# Patient Record
Sex: FEMALE | Race: WHITE | Employment: OTHER | URBAN - NONMETROPOLITAN AREA
[De-identification: names, ages, dates, MRNs, and addresses within clinical notes are randomized per-mention and may not be internally consistent; named-entity substitution may affect disease eponyms.]

---

## 2023-12-29 PROBLEM — R53.83 FATIGUE: Status: ACTIVE | Noted: 2023-12-29

## 2023-12-29 PROBLEM — E03.9 HYPOTHYROIDISM: Status: ACTIVE | Noted: 2023-12-29

## 2023-12-29 PROBLEM — Z95.0 PACEMAKER: Status: ACTIVE | Noted: 2023-12-29

## 2023-12-29 PROBLEM — G47.30 SLEEP APNEA: Status: ACTIVE | Noted: 2023-12-29

## 2023-12-29 PROBLEM — E05.90 THYROTOXICOSIS WITH OR WITHOUT GOITER: Status: ACTIVE | Noted: 2023-12-29

## 2023-12-29 PROBLEM — I49.5 SICK SINUS SYNDROME DUE TO SA NODE DYSFUNCTION (MULTI): Status: ACTIVE | Noted: 2023-12-29

## 2023-12-29 PROBLEM — I10 HYPERTENSION: Status: ACTIVE | Noted: 2023-12-29

## 2023-12-29 PROBLEM — N18.30 CKD (CHRONIC KIDNEY DISEASE), STAGE III (MULTI): Status: ACTIVE | Noted: 2023-12-29

## 2023-12-29 PROBLEM — I48.0 PAROXYSMAL ATRIAL FIBRILLATION (MULTI): Status: ACTIVE | Noted: 2023-12-29

## 2023-12-29 PROBLEM — I48.92 ATRIAL FLUTTER (MULTI): Status: ACTIVE | Noted: 2023-12-29

## 2023-12-29 PROBLEM — E78.5 HYPERLIPIDEMIA: Status: ACTIVE | Noted: 2023-12-29

## 2024-01-11 LAB
Lab: 138 MG/DL (ref 11–20)
Lab: 169 PG/ML (ref 12–88)
Lab: 232 MG/G{CRE} (ref 0–200)
Lab: 32 MG/DL (ref 0–9)
NON-UH HIE % IRON SATURATION: 18.9 % (ref 20–50)
NON-UH HIE ALBUMIN LEVEL: 4.2 G/DL (ref 3.5–5.7)
NON-UH HIE ANION GAP: 12.2 (ref 6–15)
NON-UH HIE BLOOD UREA NITROGEN: 60 MG/DL (ref 7–25)
NON-UH HIE CALCIUM: 9.1 MG/DL (ref 8.6–10.3)
NON-UH HIE CARBON DIOXIDE: 24.7 MMOL/L (ref 21–31)
NON-UH HIE CHLORIDE: 105 MMOL/L (ref 98–107)
NON-UH HIE CHOL/HDL RATIO: 3.5
NON-UH HIE CHOLESTEROL: 131 MG/DL (ref 140–200)
NON-UH HIE CREATININE: 3.72 MG/DL (ref 0.6–1.2)
NON-UH HIE ESTIMATED GFR: 11.55
NON-UH HIE FERRITIN: 40.7 NG/ML (ref 11–306.8)
NON-UH HIE GLUCOSE: 99 MG/DL (ref 70–100)
NON-UH HIE HDL CHOLESTEROL: 37 MG/DL (ref 23–92)
NON-UH HIE HEMATOCRIT: 36.2 % (ref 34–46.4)
NON-UH HIE HEMOGLOBIN: 12 G/DL (ref 11.8–15.4)
NON-UH HIE IRON: 63 UG/DL (ref 50–212)
NON-UH HIE LDL CHOLESTEROL,CALCULATED: 58 MG/DL (ref 0–100)
NON-UH HIE MAGNESIUM: 1.9 MG/DL (ref 1.9–2.7)
NON-UH HIE MEAN CORPUSCULAR HEMOGLOBIN: 31.9 PG (ref 24.7–34.3)
NON-UH HIE MEAN CORPUSCULAR HGB CONC: 33.2 G/DL (ref 32–35)
NON-UH HIE MEAN CORPUSCULAR VOLUME: 96.2 FL (ref 80–100)
NON-UH HIE MEAN PLATELET VOLUME: 8.3 FL (ref 6.3–10.7)
NON-UH HIE PHOSPHORUS: 4.2 MG/DL (ref 2.5–4.5)
NON-UH HIE PLATELET COUNT: 253 10*3/UL (ref 150–450)
NON-UH HIE POTASSIUM: 4.9 MMOL/L (ref 3.5–5.1)
NON-UH HIE RED BLOOD COUNT: 3.77 (ref 3.6–5)
NON-UH HIE RED CELL DISTRIBUTION WIDTH: 13.9 % (ref 11.9–15.3)
NON-UH HIE SODIUM: 137 MMOL/L (ref 136–145)
NON-UH HIE TOTAL IRON BINDING CAPACITY: 333 UG/DL (ref 255–450)
NON-UH HIE TRANSFERRIN: 238 MG/DL (ref 203–362)
NON-UH HIE TRIGLYCERIDE W/REFLEX: 180 MG/DL (ref 0–149)
NON-UH HIE URIC ACID: 6.8 MG/DL (ref 2.3–6.6)
NON-UH HIE VITAMIN D 25 HYDROXY TOTAL: 40.9 NG/ML (ref 30–100)
NON-UH HIE VLDL CHOLESTEROL: 36 MG/DL
NON-UH HIE WHITE BLOOD COUNT: 6.9 10*3/UL (ref 3.8–11.6)

## 2024-01-17 ENCOUNTER — TELEPHONE (OUTPATIENT)
Dept: CARDIOLOGY | Facility: CLINIC | Age: 84
End: 2024-01-17
Payer: MEDICARE

## 2024-01-17 NOTE — TELEPHONE ENCOUNTER
Attempted to call patient to advise appointment with MT tomorrow 1/18 has been cancelled d/t MT being out of office. Phone is disconnected as well as the emergency contact.

## 2024-01-18 ENCOUNTER — APPOINTMENT (OUTPATIENT)
Dept: CARDIOLOGY | Facility: CLINIC | Age: 84
End: 2024-01-18
Payer: MEDICARE

## 2024-02-28 ENCOUNTER — OFFICE VISIT (OUTPATIENT)
Dept: CARDIOLOGY | Facility: CLINIC | Age: 84
End: 2024-02-28
Payer: MEDICARE

## 2024-02-28 VITALS
HEIGHT: 64 IN | BODY MASS INDEX: 21.85 KG/M2 | HEART RATE: 60 BPM | DIASTOLIC BLOOD PRESSURE: 68 MMHG | SYSTOLIC BLOOD PRESSURE: 116 MMHG | WEIGHT: 128 LBS

## 2024-02-28 DIAGNOSIS — I48.0 PAROXYSMAL ATRIAL FIBRILLATION (MULTI): Primary | ICD-10-CM

## 2024-02-28 DIAGNOSIS — E78.2 MIXED HYPERLIPIDEMIA: ICD-10-CM

## 2024-02-28 DIAGNOSIS — I10 PRIMARY HYPERTENSION: ICD-10-CM

## 2024-02-28 DIAGNOSIS — I48.3 TYPICAL ATRIAL FLUTTER (MULTI): ICD-10-CM

## 2024-02-28 DIAGNOSIS — Z95.0 PACEMAKER: ICD-10-CM

## 2024-02-28 DIAGNOSIS — Z78.9 NEVER SMOKED CIGARETTES: ICD-10-CM

## 2024-02-28 DIAGNOSIS — G47.33 OBSTRUCTIVE SLEEP APNEA SYNDROME: ICD-10-CM

## 2024-02-28 PROCEDURE — 93000 ELECTROCARDIOGRAM COMPLETE: CPT | Performed by: INTERNAL MEDICINE

## 2024-02-28 PROCEDURE — 1160F RVW MEDS BY RX/DR IN RCRD: CPT | Performed by: INTERNAL MEDICINE

## 2024-02-28 PROCEDURE — 3074F SYST BP LT 130 MM HG: CPT | Performed by: INTERNAL MEDICINE

## 2024-02-28 PROCEDURE — 3078F DIAST BP <80 MM HG: CPT | Performed by: INTERNAL MEDICINE

## 2024-02-28 PROCEDURE — 1159F MED LIST DOCD IN RCRD: CPT | Performed by: INTERNAL MEDICINE

## 2024-02-28 PROCEDURE — 99214 OFFICE O/P EST MOD 30 MIN: CPT | Performed by: INTERNAL MEDICINE

## 2024-02-28 NOTE — PROGRESS NOTES
Subjective   Saniya Carrillo is a 83 y.o. female       Chief Complaint    Follow-up          HPI        Patient is here for follow-up continue management for history of tachybradycardia syndrome, long-term anticoagulation, hyperlipidemia and chronic kidney disease.  Since last time I saw her she reports she is doing well.  She denies any cardiac complaint of chest pain, palpitation, lightheadedness, dizziness or syncope.  Her recent device check and lab work noted and reviewed with her.    ASSESSMENT:      1. 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  2. Anticoagulation has been discussed numerous times with the patient and her son. They both elected for aspirin therapy alone.  3. Hyperlipidemia, on treatment.  4. Kidney disease apparently approaching stage IV she followed by nephrology. She underwent shunt placement in the right arm   5. Mild senile dementia.  6. Minimal obesity  7. Increased risk of fall. Patient reports a couple fall in the recent past          RECOMMENDATIONS:      1. The patient will remain on the same therapy unchanged. Encouraged her to continue to be compliant with aspirin  2. Risks, benefits, and alternatives of anticoagulation were reviewed with the patient. The patient elected for aspirin therapy.  3. The patient was advised to continue current therapy unchanged.  4. The patient was advised to notify me any change in cardiac status or symptoms.  5. I with her her recent hospitalization record and device check  6. We will see her back in the office in 6 months in followup. EKG  7. Advised the patient to discuss with her vascular surgeon the outcome of her recent shunt placement concerning I could not feel any thrill  Review of Systems   All other systems reviewed  "and are negative.           Vitals:    02/28/24 1440   BP: 116/68   BP Location: Left arm   Patient Position: Sitting   Pulse: 60   Weight: 58.1 kg (128 lb)   Height: 1.626 m (5' 4\")        EKG done in office today     Objective   Physical Exam    Allergies  Chlorpheniramine and Latex     Current Medications    Current Outpatient Medications:     Aricept 10 mg tablet, Take 1 tablet (10 mg) by mouth once daily., Disp: , Rfl:     aspirin 81 mg EC tablet, Take 1 tablet (81 mg) by mouth once daily., Disp: , Rfl:     buPROPion XL (Wellbutrin XL) 150 mg 24 hr tablet, Take 1 tablet (150 mg) by mouth 2 times a day., Disp: , Rfl:     calcitriol (Rocaltrol) 0.25 mcg capsule, Take 1 capsule (0.25 mcg) by mouth 2 times a week. Monday and Friday, Disp: , Rfl:     cholecalciferol (Vitamin D-3) 25 MCG (1000 UT) capsule, Take 1 capsule (25 mcg) by mouth once daily., Disp: , Rfl:     citalopram (CeleXA) 20 mg tablet, Take 1 tablet (20 mg) by mouth once daily., Disp: , Rfl:     flecainide (Tambocor) 50 mg tablet, Take 1 tablet (50 mg) by mouth every 12 hours., Disp: , Rfl:     levothyroxine (Synthroid, Levoxyl) 100 mcg tablet, Take 1 tablet (100 mcg) by mouth once daily., Disp: , Rfl:     LORazepam (Ativan) 0.5 mg tablet, Take 1 tablet (0.5 mg) by mouth every 12 hours., Disp: , Rfl:     pantoprazole (ProtoNix) 40 mg EC tablet, Take 1 tablet (40 mg) by mouth once daily., Disp: , Rfl:     rosuvastatin (Crestor) 5 mg tablet, Take 1 tablet (5 mg) by mouth once daily at bedtime., Disp: , Rfl:                      Assessment/Plan   1. Paroxysmal atrial fibrillation (CMS/HCC)  Follow Up In Cardiology    ECG 12 Lead      2. Typical atrial flutter (CMS/HCC)        3. Mixed hyperlipidemia        4. Primary hypertension        5. Pacemaker        6. Obstructive sleep apnea syndrome        7. BMI 21.0-21.9, adult        8. Never smoked cigarettes                 Scribe Attestation  By signing my name below, ELIJAH [email or web address] , Scribcullen   attest that " this documentation has been prepared under the direction and in the presence of Maame Adorno MD.     Provider Attestation - Scribe documentation    All medical record entries made by the Scribe were at my direction and personally dictated by me. I have reviewed the chart and agree that the record accurately reflects my personal performance of the history, physical exam, discussion and plan.

## 2024-05-13 DIAGNOSIS — I48.92 UNSPECIFIED ATRIAL FLUTTER (MULTI): ICD-10-CM

## 2024-05-15 RX ORDER — FLECAINIDE ACETATE 50 MG/1
50 TABLET ORAL EVERY 12 HOURS
Qty: 180 TABLET | Refills: 3 | Status: SHIPPED | OUTPATIENT
Start: 2024-05-15

## 2024-07-23 ENCOUNTER — APPOINTMENT (OUTPATIENT)
Dept: OTOLARYNGOLOGY | Facility: CLINIC | Age: 84
End: 2024-07-23
Payer: MEDICARE

## 2024-07-23 VITALS — BODY MASS INDEX: 21.81 KG/M2 | WEIGHT: 130.9 LBS | HEIGHT: 65 IN

## 2024-07-23 DIAGNOSIS — R05.3 CHRONIC COUGH: ICD-10-CM

## 2024-07-23 DIAGNOSIS — R09.82 PND (POST-NASAL DRIP): Primary | ICD-10-CM

## 2024-07-23 PROCEDURE — 31231 NASAL ENDOSCOPY DX: CPT | Performed by: OTOLARYNGOLOGY

## 2024-07-23 PROCEDURE — 1160F RVW MEDS BY RX/DR IN RCRD: CPT | Performed by: OTOLARYNGOLOGY

## 2024-07-23 PROCEDURE — 1159F MED LIST DOCD IN RCRD: CPT | Performed by: OTOLARYNGOLOGY

## 2024-07-23 PROCEDURE — 99203 OFFICE O/P NEW LOW 30 MIN: CPT | Performed by: OTOLARYNGOLOGY

## 2024-07-23 PROCEDURE — 1126F AMNT PAIN NOTED NONE PRSNT: CPT | Performed by: OTOLARYNGOLOGY

## 2024-07-23 RX ORDER — DOCUSATE SODIUM 100 MG/1
CAPSULE, LIQUID FILLED ORAL
COMMUNITY
Start: 2023-09-16

## 2024-07-23 RX ORDER — ASCORBIC ACID 125 MG
TABLET,CHEWABLE ORAL
COMMUNITY

## 2024-07-23 ASSESSMENT — PAIN SCALES - GENERAL: PAINLEVEL: 0-NO PAIN

## 2024-07-23 NOTE — PROGRESS NOTES
"Chief Complaint:  Chronic cough    Referring Provider: No ref. provider found    History of Present Illness:    Saniya Carrillo is a 83 y.o. female, presenting for evaluation of postnasal drainage and chronic cough.  She states that she feels the sensation of postnasal drip dripping down the back of her throat.   She takes Protonix 40 mg daily for reflux disease.  She presents today with the main complaint of chronic cough.  She underwent Clarifix and treatment with ipratropium bromide, neither of which worked.  She denies any recent sinus infections.  She has never had surgery on her nose except for the previously mentioned cryoablation treatment.  Her son accompanies her today and provides most of her history.    ?  Review of Systems:    Review of symptoms was negative except for those stated including Cardiopulmonary, Genitourinary, Gastrointestinal, Psychological, Sleep pattern, Endocrine, Eyes, Neurologic, Musculoskeletal, Skin, Hematologic/Lymphatic and Allergic/Immunologic.     Medical History:    I have reviewed the patient's updated past medical history, surgical history, family history, social history, as well as current medications and allergies as of 7/23/2024. Changes to these items have been updated and marked as reviewed in the electronic medical record.    Physical Exam:    Vitals:  height is 1.638 m (5' 4.5\") and weight is 59.4 kg (130 lb 14.4 oz).   General: Patient doing well overall and is in no apparent distress.  Psych: Pleasant affect, and answers questions appropriately.  Head & Face: Symmetric facial movements  Eyes: Pupils equal, round, reactive.  Extraocular movements intact without gaze restrictions or nystagmus. No epiphora.  Ears:  External auditory canals are normal.  Tympanic membranes are clear.  No middle ear effusion is seen.  All middle ear landmarks are normal.  Nose: Anterior rhinoscopy revealed normal sinonasal mucosa. More posterior areas of the nasal cavity could not be " completely examined.  Oral Cavity/Oropharynx:  Without lesions or masses to visual exam.  Neck: Supple without lymphadenopathy.  Lungs: Non-labored, and without evidence of stridor.  Cardiac: Pulses are strong, well-perfused.  Extremities: Without gross evidence of clubbing, cyanosis, or edema.  Neuro: Cranial nerves II-XII grossly intact; Intact facial movements.    Assessment:     Saniya Carrillo is a 83 y.o. female with chronic cough, sensation of postnasal drip, nonproductive nasal congestion    Plan:      I spent a considerable amount of time discussing exam ongoing issues with nasal congestion and possible postnasal drip.  On nasal endoscopy she does not have any evidence of sinusitis or postnasal drainage.  She does seem to have some postcricoid swelling however.  It is possible that undertreated reflux disease could be contributing to her chronic cough and other symptoms.  She may have vasomotor rhinitis but if so this is a relatively minor contributor. She did not respond to Clarifix or ipratropium bromide.  Her main issue is habitual and chronic throat clearing which both her and her son state is occurring throughout the day every day.  I offered her a trial of mometasone rinses but she was not interested.  I think it would be reasonable for her to visit with our laryngology staff to determine if she has any other factors that would be contributing to her chronic cough    Sary Valdez MD, M.Eng.   of Otolaryngology - Head & Neck Surgery  Division of Rhinology and Endoscopic Skull Base Surgery  [facility]

## 2024-07-23 NOTE — LETTER
July 23, 2024     Caron Fuller MD  [id number]  Department Of Otolaryngology  [facility] [address]    Patient: Saniya Carrillo   YOB: 1940   Date of Visit: 7/23/2024       Dear Dr. Caron Fuller MD:    Thank you for referring Saniya Carrillo to me for evaluation. Below are my notes for this consultation.  If you have questions, please do not hesitate to call me. I look forward to following your patient along with you.       Sincerely,     Sary Valdez MD      CC: No Recipients  ______________________________________________________________________________________    Chief Complaint:  Chronic cough    Referring Provider: No ref. provider found    History of Present Illness:    Saniya Carrillo is a 83 y.o. female, presenting for evaluation of postnasal drainage and chronic cough.  She states that she feels the sensation of postnasal drip dripping down the back of her throat.  She has been following with Dr. Fuller who has been helping her with dysphagia.  She recently underwent an EGD and several other diagnostic tests.  She takes Protonix 40 mg daily for reflux disease.  She presents today with the main complaint of chronic cough.  She underwent Clarifix and treatment with ipratropium bromide, neither of which worked.  She denies any recent sinus infections.  She has never had surgery on her nose except for the previously mentioned  cryoablation treatment.       Review of Systems:    Review of symptoms was negative except for those stated including Cardiopulmonary, Genitourinary, Gastrointestinal, Psychological, Sleep pattern, Endocrine, Eyes, Neurologic, Musculoskeletal, Skin, Hematologic/Lymphatic and Allergic/Immunologic.     Medical History:    I have reviewed the patient's updated past medical history, surgical history, family history, social history, as well as current medications and allergies as of 7/23/2024. Changes to these items have been updated and marked as reviewed in the electronic  "medical record.    Physical Exam:    Vitals:  height is 1.638 m (5' 4.5\") and weight is 59.4 kg (130 lb 14.4 oz).   General: Patient doing well overall and is in no apparent distress.  Psych: Pleasant affect, and answers questions appropriately.  Head & Face: Symmetric facial movements  Eyes: Pupils equal, round, reactive.  Extraocular movements intact without gaze restrictions or nystagmus. No epiphora.  Ears:  External auditory canals are normal.  Tympanic membranes are clear.  No middle ear effusion is seen.  All middle ear landmarks are normal.  Nose: Anterior rhinoscopy revealed normal sinonasal mucosa. More posterior areas of the nasal cavity could not be completely examined.  Oral Cavity/Oropharynx:  Without lesions or masses to visual exam.  Neck: Supple without lymphadenopathy.  Lungs: Non-labored, and without evidence of stridor.  Cardiac: Pulses are strong, well-perfused.  Extremities: Without gross evidence of clubbing, cyanosis, or edema.  Neuro: Cranial nerves II-XII grossly intact; Intact facial movements.    Assessment:     Saniya Carrillo is a 83 y.o. female with chronic cough, sensation of postnasal drip, nonproductive nasal congestion    Plan:      I spent a considerable amount of time discussing exam ongoing issues with nasal congestion and possible postnasal drip.  On nasal endoscopy she does not have any evidence of sinusitis or postnasal drainage.  She does seem to have some postcricoid swelling however.  It is possible that undertreated reflux disease could be contributing to her chronic cough and other symptoms.  She may have vasomotor rhinitis but if so this is a relatively minor contributor. She did not respond to Clarifix or ipratropium bromide.  Her main issue is habitual and chronic throat clearing which both her and her son state is occurring throughout the day every day.  I offered her a trial of mometasone rinses but she was not interested.  I think it would be reasonable for her to " visit with our laryngology staff to determine if she has any other factors that would be contributing to her chronic cough    Sary Valdez MD, M.Eng.   of Otolaryngology - Head & Neck Surgery  Division of Rhinology and Endoscopic Skull Base Surgery  [facility]

## 2024-08-28 ENCOUNTER — APPOINTMENT (OUTPATIENT)
Dept: CARDIOLOGY | Facility: CLINIC | Age: 84
End: 2024-08-28
Payer: MEDICARE

## 2024-08-28 VITALS
WEIGHT: 130 LBS | BODY MASS INDEX: 21.66 KG/M2 | DIASTOLIC BLOOD PRESSURE: 56 MMHG | SYSTOLIC BLOOD PRESSURE: 120 MMHG | HEIGHT: 65 IN | HEART RATE: 60 BPM

## 2024-08-28 DIAGNOSIS — Z78.9 NEVER SMOKED CIGARETTES: ICD-10-CM

## 2024-08-28 DIAGNOSIS — I48.0 PAROXYSMAL ATRIAL FIBRILLATION (MULTI): ICD-10-CM

## 2024-08-28 DIAGNOSIS — I10 PRIMARY HYPERTENSION: ICD-10-CM

## 2024-08-28 DIAGNOSIS — E78.2 MIXED HYPERLIPIDEMIA: ICD-10-CM

## 2024-08-28 DIAGNOSIS — Z95.0 PACEMAKER: ICD-10-CM

## 2024-08-28 DIAGNOSIS — I49.5 SICK SINUS SYNDROME DUE TO SA NODE DYSFUNCTION (MULTI): ICD-10-CM

## 2024-08-28 DIAGNOSIS — I48.3 TYPICAL ATRIAL FLUTTER (MULTI): Primary | ICD-10-CM

## 2024-08-28 PROCEDURE — 3074F SYST BP LT 130 MM HG: CPT | Performed by: INTERNAL MEDICINE

## 2024-08-28 PROCEDURE — 1160F RVW MEDS BY RX/DR IN RCRD: CPT | Performed by: INTERNAL MEDICINE

## 2024-08-28 PROCEDURE — 3078F DIAST BP <80 MM HG: CPT | Performed by: INTERNAL MEDICINE

## 2024-08-28 PROCEDURE — 93000 ELECTROCARDIOGRAM COMPLETE: CPT | Performed by: INTERNAL MEDICINE

## 2024-08-28 PROCEDURE — 1036F TOBACCO NON-USER: CPT | Performed by: INTERNAL MEDICINE

## 2024-08-28 PROCEDURE — 99214 OFFICE O/P EST MOD 30 MIN: CPT | Performed by: INTERNAL MEDICINE

## 2024-08-28 PROCEDURE — 1159F MED LIST DOCD IN RCRD: CPT | Performed by: INTERNAL MEDICINE

## 2024-08-28 NOTE — LETTER
August 28, 2024     Lucas Mendenhall MD  [address] [place] [id number]    Patient: Saniya Carrillo   YOB: 1940   Date of Visit: 8/28/2024       Dear Dr. Lucas Mendenhall MD:    Thank you for referring Saniya Carrillo to me for evaluation. Below are my notes for this consultation.  If you have questions, please do not hesitate to call me. I look forward to following your patient along with you.       Sincerely,     Maame Adorno MD      CC: No Recipients  ______________________________________________________________________________________    Subjective   Saniya Carrillo is a 83 y.o. female       Chief Complaint    Follow-up          HPI      Patient is here for follow-up continue management for tachybradycardia syndrome, treatment with antiarrhythmic and hyperlipidemia.  Since last time I saw her she denies any change in cardiac status or symptoms.  She remains active.  She denies lightheadedness, dizziness or syncope.  Her recent device check noted and reviewed with her.  Now she has a functioning shunt in the right arm.    ASSESSMENT:      1. 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  2. Anticoagulation has been discussed numerous times with the patient and her son. They both elected for aspirin therapy alone.  3. Hyperlipidemia, on treatment.  4. Kidney disease apparently approaching stage IV she followed by nephrology. She underwent redo shunt placement in the right arm now is functioning  5. Mild senile dementia.  6. Minimal obesity  7. Increased risk of fall. Patient reports a couple fall in the recent past           RECOMMENDATIONS:      1. The patient will remain on the same therapy unchanged. Encouraged her to continue to be compliant with aspirin  2.  "Risks, benefits, and alternatives of anticoagulation were reviewed with the patient. The patient elected for aspirin therapy.  3. The patient was advised to continue current therapy unchanged.  4. The patient was advised to notify me any change in cardiac status or symptoms.  5. I with her labs and device check  6. We will see her back in the office in 6 months in followup. EKG    Review of Systems   All other systems reviewed and are negative.           Vitals:    08/28/24 1521   BP: 120/56   BP Location: Left arm   Patient Position: Sitting   Pulse: 60   Weight: 59 kg (130 lb)   Height: 1.638 m (5' 4.5\")      EKG done in office today      Objective   Physical Exam  Constitutional:       Appearance: Normal appearance.   HENT:      Nose: Nose normal.   Neck:      Vascular: No carotid bruit.   Cardiovascular:      Rate and Rhythm: Normal rate.      Pulses: Normal pulses.      Heart sounds: Normal heart sounds.   Pulmonary:      Effort: Pulmonary effort is normal.   Abdominal:      General: Bowel sounds are normal.      Palpations: Abdomen is soft.   Musculoskeletal:         General: Normal range of motion.      Cervical back: Normal range of motion.      Right lower leg: No edema.      Left lower leg: No edema.   Skin:     General: Skin is warm and dry.   Neurological:      General: No focal deficit present.      Mental Status: She is alert.   Psychiatric:         Mood and Affect: Mood normal.         Behavior: Behavior normal.         Thought Content: Thought content normal.         Judgment: Judgment normal.         Allergies  Chlorpheniramine and Latex     Current Medications    Current Outpatient Medications:   •  Aricept 10 mg tablet, Take 1 tablet (10 mg) by mouth once daily., Disp: , Rfl:   •  aspirin 81 mg EC tablet, Take 1 tablet (81 mg) by mouth once daily., Disp: , Rfl:   •  buPROPion XL (Wellbutrin XL) 150 mg 24 hr tablet, Take 1 tablet (150 mg) by mouth 2 times a day., Disp: , Rfl:   •  calcitriol " (Rocaltrol) 0.25 mcg capsule, Take 1 capsule (0.25 mcg) by mouth 2 times a week. Monday and Friday, Disp: , Rfl:   •  cholecalciferol (Vitamin D-3) 25 MCG (1000 UT) capsule, Take 1 capsule (25 mcg) by mouth once daily., Disp: , Rfl:   •  citalopram (CeleXA) 20 mg tablet, Take 1 tablet (20 mg) by mouth once daily., Disp: , Rfl:   •  docusate sodium (Colace) 100 mg capsule, Twice daily, Disp: , Rfl:   •  ferrous sulfate, as mg of FE, 15 mg iron (75 mg)/mL drops, Refills(s) 0, Disp: , Rfl:   •  flecainide (Tambocor) 50 mg tablet, TAKE 1 TABLET BY MOUTH EVERY 12 HOURS, Disp: 180 tablet, Rfl: 3  •  levothyroxine (Synthroid, Levoxyl) 100 mcg tablet, Take 1 tablet (100 mcg) by mouth once daily., Disp: , Rfl:   •  LORazepam (Ativan) 0.5 mg tablet, Take 1 tablet (0.5 mg) by mouth if needed., Disp: , Rfl:   •  pantoprazole (ProtoNix) 40 mg EC tablet, Take 1 tablet (40 mg) by mouth once daily., Disp: , Rfl:   •  rosuvastatin (Crestor) 5 mg tablet, Take 1 tablet (5 mg) by mouth once daily at bedtime., Disp: , Rfl:   •  ubidecarenone (coenzyme Q10) 50 mg tablet,chewable, as directed Orally, Disp: , Rfl:                      Assessment/Plan   1. Typical atrial flutter (Multi)  Follow Up In Cardiology    ECG 12 Lead      2. Paroxysmal atrial fibrillation (Multi)  Follow Up In Cardiology      3. Mixed hyperlipidemia        4. Primary hypertension        5. Pacemaker        6. Sick sinus syndrome due to SA node dysfunction (Multi)        7. BMI 21.0-21.9, adult        8. Never smoked cigarettes                 Scribe Attestation  By signing my name below, I, Naa FALL RN   , Scribe   attest that this documentation has been prepared under the direction and in the presence of Maame Adorno MD.     Provider Attestation - Scribe documentation    All medical record entries made by the Scribe were at my direction and personally dictated by me. I have reviewed the chart and agree that the record accurately reflects my personal  performance of the history, physical exam, discussion and plan.

## 2024-08-28 NOTE — PATIENT INSTRUCTIONS
Please bring all medicines, vitamins, and herbal supplements with you when you come to the office.    Prescriptions will not be filled unless you are compliant with your follow up appointments or have a follow up appointment scheduled as per instruction of your physician. Refills should be requested at the time of your visit.     Fall Prevention Education Given     Pacemaker/Defibrillator follow up per routine

## 2024-08-28 NOTE — PROGRESS NOTES
Subjective   Saniya Carrillo is a 83 y.o. female       Chief Complaint    Follow-up          HPI      Patient is here for follow-up continue management for tachybradycardia syndrome, treatment with antiarrhythmic and hyperlipidemia.  Since last time I saw her she denies any change in cardiac status or symptoms.  She remains active.  She denies lightheadedness, dizziness or syncope.  Her recent device check noted and reviewed with her.  Now she has a functioning shunt in the right arm.    ASSESSMENT:      1. 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  2. Anticoagulation has been discussed numerous times with the patient and her son. They both elected for aspirin therapy alone.  3. Hyperlipidemia, on treatment.  4. Kidney disease apparently approaching stage IV she followed by nephrology. She underwent redo shunt placement in the right arm now is functioning  5. Mild senile dementia.  6. Minimal obesity  7. Increased risk of fall. Patient reports a couple fall in the recent past           RECOMMENDATIONS:      1. The patient will remain on the same therapy unchanged. Encouraged her to continue to be compliant with aspirin  2. Risks, benefits, and alternatives of anticoagulation were reviewed with the patient. The patient elected for aspirin therapy.  3. The patient was advised to continue current therapy unchanged.  4. The patient was advised to notify me any change in cardiac status or symptoms.  5. I with her labs and device check  6. We will see her back in the office in 6 months in followup. EKG    Review of Systems   All other systems reviewed and are negative.           Vitals:    08/28/24 1521   BP: 120/56   BP Location: Left arm   Patient Position: Sitting   Pulse: 60   Weight: 59 kg  "(130 lb)   Height: 1.638 m (5' 4.5\")      EKG done in office today      Objective   Physical Exam  Constitutional:       Appearance: Normal appearance.   HENT:      Nose: Nose normal.   Neck:      Vascular: No carotid bruit.   Cardiovascular:      Rate and Rhythm: Normal rate.      Pulses: Normal pulses.      Heart sounds: Normal heart sounds.   Pulmonary:      Effort: Pulmonary effort is normal.   Abdominal:      General: Bowel sounds are normal.      Palpations: Abdomen is soft.   Musculoskeletal:         General: Normal range of motion.      Cervical back: Normal range of motion.      Right lower leg: No edema.      Left lower leg: No edema.   Skin:     General: Skin is warm and dry.   Neurological:      General: No focal deficit present.      Mental Status: She is alert.   Psychiatric:         Mood and Affect: Mood normal.         Behavior: Behavior normal.         Thought Content: Thought content normal.         Judgment: Judgment normal.         Allergies  Chlorpheniramine and Latex     Current Medications    Current Outpatient Medications:     Aricept 10 mg tablet, Take 1 tablet (10 mg) by mouth once daily., Disp: , Rfl:     aspirin 81 mg EC tablet, Take 1 tablet (81 mg) by mouth once daily., Disp: , Rfl:     buPROPion XL (Wellbutrin XL) 150 mg 24 hr tablet, Take 1 tablet (150 mg) by mouth 2 times a day., Disp: , Rfl:     calcitriol (Rocaltrol) 0.25 mcg capsule, Take 1 capsule (0.25 mcg) by mouth 2 times a week. Monday and Friday, Disp: , Rfl:     cholecalciferol (Vitamin D-3) 25 MCG (1000 UT) capsule, Take 1 capsule (25 mcg) by mouth once daily., Disp: , Rfl:     citalopram (CeleXA) 20 mg tablet, Take 1 tablet (20 mg) by mouth once daily., Disp: , Rfl:     docusate sodium (Colace) 100 mg capsule, Twice daily, Disp: , Rfl:     ferrous sulfate, as mg of FE, 15 mg iron (75 mg)/mL drops, Refills(s) 0, Disp: , Rfl:     flecainide (Tambocor) 50 mg tablet, TAKE 1 TABLET BY MOUTH EVERY 12 HOURS, Disp: 180 tablet, " Rfl: 3    levothyroxine (Synthroid, Levoxyl) 100 mcg tablet, Take 1 tablet (100 mcg) by mouth once daily., Disp: , Rfl:     LORazepam (Ativan) 0.5 mg tablet, Take 1 tablet (0.5 mg) by mouth if needed., Disp: , Rfl:     pantoprazole (ProtoNix) 40 mg EC tablet, Take 1 tablet (40 mg) by mouth once daily., Disp: , Rfl:     rosuvastatin (Crestor) 5 mg tablet, Take 1 tablet (5 mg) by mouth once daily at bedtime., Disp: , Rfl:     ubidecarenone (coenzyme Q10) 50 mg tablet,chewable, as directed Orally, Disp: , Rfl:                      Assessment/Plan   1. Typical atrial flutter (Multi)  Follow Up In Cardiology    ECG 12 Lead      2. Paroxysmal atrial fibrillation (Multi)  Follow Up In Cardiology      3. Mixed hyperlipidemia        4. Primary hypertension        5. Pacemaker        6. Sick sinus syndrome due to SA node dysfunction (Multi)        7. BMI 21.0-21.9, adult        8. Never smoked cigarettes                 Scribe Attestation  By signing my name below, I, Naa FALL RN   , Scribe   attest that this documentation has been prepared under the direction and in the presence of Maame Adorno MD.     Provider Attestation - Scribe documentation    All medical record entries made by the Scribe were at my direction and personally dictated by me. I have reviewed the chart and agree that the record accurately reflects my personal performance of the history, physical exam, discussion and plan.

## 2024-10-15 DIAGNOSIS — E78.2 MIXED HYPERLIPIDEMIA: ICD-10-CM

## 2024-10-15 RX ORDER — ROSUVASTATIN CALCIUM 5 MG/1
5 TABLET, COATED ORAL NIGHTLY
Qty: 90 TABLET | Refills: 3 | Status: SHIPPED | OUTPATIENT
Start: 2024-10-15 | End: 2025-10-15

## 2024-11-01 ENCOUNTER — APPOINTMENT (OUTPATIENT)
Dept: OTOLARYNGOLOGY | Facility: CLINIC | Age: 84
End: 2024-11-01
Payer: MEDICARE

## 2024-11-19 ENCOUNTER — APPOINTMENT (OUTPATIENT)
Dept: OTOLARYNGOLOGY | Facility: CLINIC | Age: 84
End: 2024-11-19
Payer: MEDICARE

## 2025-02-26 ENCOUNTER — APPOINTMENT (OUTPATIENT)
Dept: CARDIOLOGY | Facility: CLINIC | Age: 85
End: 2025-02-26
Payer: MEDICARE

## 2025-02-26 VITALS
BODY MASS INDEX: 23.22 KG/M2 | WEIGHT: 136 LBS | HEIGHT: 64 IN | HEART RATE: 60 BPM | SYSTOLIC BLOOD PRESSURE: 80 MMHG | DIASTOLIC BLOOD PRESSURE: 50 MMHG

## 2025-02-26 DIAGNOSIS — Z95.0 PACEMAKER: ICD-10-CM

## 2025-02-26 DIAGNOSIS — I10 PRIMARY HYPERTENSION: ICD-10-CM

## 2025-02-26 DIAGNOSIS — Z78.9 NEVER SMOKED CIGARETTES: ICD-10-CM

## 2025-02-26 DIAGNOSIS — I48.3 TYPICAL ATRIAL FLUTTER (MULTI): ICD-10-CM

## 2025-02-26 DIAGNOSIS — I48.92 UNSPECIFIED ATRIAL FLUTTER (MULTI): ICD-10-CM

## 2025-02-26 DIAGNOSIS — E78.2 MIXED HYPERLIPIDEMIA: ICD-10-CM

## 2025-02-26 DIAGNOSIS — R53.83 OTHER FATIGUE: ICD-10-CM

## 2025-02-26 DIAGNOSIS — I48.0 PAROXYSMAL ATRIAL FIBRILLATION (MULTI): ICD-10-CM

## 2025-02-26 DIAGNOSIS — I49.5 SICK SINUS SYNDROME DUE TO SA NODE DYSFUNCTION (MULTI): Primary | ICD-10-CM

## 2025-02-26 PROCEDURE — 1159F MED LIST DOCD IN RCRD: CPT | Performed by: INTERNAL MEDICINE

## 2025-02-26 PROCEDURE — 3078F DIAST BP <80 MM HG: CPT | Performed by: INTERNAL MEDICINE

## 2025-02-26 PROCEDURE — 99214 OFFICE O/P EST MOD 30 MIN: CPT | Performed by: INTERNAL MEDICINE

## 2025-02-26 PROCEDURE — 1036F TOBACCO NON-USER: CPT | Performed by: INTERNAL MEDICINE

## 2025-02-26 PROCEDURE — 1160F RVW MEDS BY RX/DR IN RCRD: CPT | Performed by: INTERNAL MEDICINE

## 2025-02-26 PROCEDURE — 93000 ELECTROCARDIOGRAM COMPLETE: CPT | Performed by: INTERNAL MEDICINE

## 2025-02-26 PROCEDURE — 3074F SYST BP LT 130 MM HG: CPT | Performed by: INTERNAL MEDICINE

## 2025-02-26 RX ORDER — MIDODRINE HYDROCHLORIDE 5 MG/1
5 TABLET ORAL AS NEEDED
COMMUNITY
Start: 2024-12-13

## 2025-02-26 RX ORDER — FLECAINIDE ACETATE 50 MG/1
50 TABLET ORAL EVERY 12 HOURS
Qty: 180 TABLET | Refills: 3 | Status: SHIPPED | OUTPATIENT
Start: 2025-02-26

## 2025-02-26 NOTE — PROGRESS NOTES
Subjective   Saniya Carrillo is a 84 y.o. female       Chief Complaint    Follow-up          HPI        Patient is here for follow-up continue management for classic tachybradycardia syndrome, long-term anticoagulation, hyperlipidemia.  Since last time I saw her she denies any change in cardiac status or symptoms.  She feels reasonably well.  She reports few episodes of orthostatic hypotension for which she was given some midodrine by her neurologist.  She use it sparingly.  Her recent lab  noted and reviewed with her.  Her last device check was about a year ago.  Family does not remember if 1 done in the last 6 months    ASSESSMENT:      1. 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  2. Anticoagulation has been discussed numerous times with the patient and her son. They both elected for aspirin therapy alone.  3. Hyperlipidemia, on treatment.  4. Kidney disease apparently approaching stage IV she followed by nephrology. She underwent redo shunt placement in the right arm now is functioning  5. Mild senile dementia.  6. Minimal obesity  7. Increased risk of fall. Patient reports a couple fall in the recent past with some component of orthostatic hypotension.  She was given midodrine by her neurologist            RECOMMENDATIONS:      1. The patient will remain on the same therapy unchanged. Encouraged her to continue to be compliant with aspirin  2. Risks, benefits, and alternatives of anticoagulation were reviewed with the patient. The patient elected for aspirin therapy.  3. The patient was advised to continue current therapy unchanged.  4. The patient was advised to notify me any change in cardiac status or symptoms.  5. I advised her to have a device check  6. We will see her back in the  "office in 6 months in followup.   Review of Systems   All other systems reviewed and are negative.           Vitals:    02/26/25 1441   BP: 80/50   BP Location: Left arm   Patient Position: Sitting   Pulse: 60   Weight: 61.7 kg (136 lb)   Height: 1.626 m (5' 4\")      EKG done in office today    Objective   Physical Exam  Constitutional:       Appearance: Normal appearance.   HENT:      Nose: Nose normal.   Neck:      Vascular: No carotid bruit.   Cardiovascular:      Rate and Rhythm: Normal rate.      Pulses: Normal pulses.      Heart sounds: Normal heart sounds.   Pulmonary:      Effort: Pulmonary effort is normal.   Abdominal:      General: Bowel sounds are normal.      Palpations: Abdomen is soft.   Musculoskeletal:         General: Normal range of motion.      Cervical back: Normal range of motion.      Right lower leg: No edema.      Left lower leg: No edema.   Skin:     General: Skin is warm and dry.   Neurological:      General: No focal deficit present.      Mental Status: She is alert.   Psychiatric:         Mood and Affect: Mood normal.         Behavior: Behavior normal.         Thought Content: Thought content normal.         Judgment: Judgment normal.         Allergies  Chlorpheniramine and Latex     Current Medications    Current Outpatient Medications:     Aricept 10 mg tablet, Take 1 tablet (10 mg) by mouth once daily., Disp: , Rfl:     aspirin 81 mg EC tablet, Take 1 tablet (81 mg) by mouth once daily., Disp: , Rfl:     buPROPion XL (Wellbutrin XL) 150 mg 24 hr tablet, Take 1 tablet (150 mg) by mouth 2 times a day., Disp: , Rfl:     calcitriol (Rocaltrol) 0.25 mcg capsule, Take 1 capsule (0.25 mcg) by mouth 2 times a week. Monday and Friday, Disp: , Rfl:     cholecalciferol (Vitamin D-3) 25 MCG (1000 UT) capsule, Take 1 capsule (25 mcg) by mouth once daily., Disp: , Rfl:     citalopram (CeleXA) 20 mg tablet, Take 1 tablet (20 mg) by mouth once daily., Disp: , Rfl:     docusate sodium (Colace) 100 mg " capsule, Twice daily, Disp: , Rfl:     ferrous sulfate, as mg of FE, 15 mg iron (75 mg)/mL drops, Refills(s) 0 (Patient taking differently: As needed at dialysis), Disp: , Rfl:     flecainide (Tambocor) 50 mg tablet, TAKE 1 TABLET BY MOUTH EVERY 12 HOURS, Disp: 180 tablet, Rfl: 3    levothyroxine (Synthroid, Levoxyl) 100 mcg tablet, Take 1 tablet (100 mcg) by mouth once daily., Disp: , Rfl:     LORazepam (Ativan) 0.5 mg tablet, Take 1 tablet (0.5 mg) by mouth if needed., Disp: , Rfl:     midodrine (Proamatine) 5 mg tablet, 1 tablet (5 mg) if needed., Disp: , Rfl:     pantoprazole (ProtoNix) 40 mg EC tablet, Take 1 tablet (40 mg) by mouth once daily., Disp: , Rfl:     rosuvastatin (Crestor) 5 mg tablet, Take 1 tablet (5 mg) by mouth once daily at bedtime., Disp: 90 tablet, Rfl: 3    ubidecarenone (coenzyme Q10) 50 mg tablet,chewable, as directed Orally, Disp: , Rfl:                      Assessment/Plan   1. Sick sinus syndrome due to SA node dysfunction (Multi)        2. Typical atrial flutter (Multi)  Follow Up In Cardiology      3. Paroxysmal atrial fibrillation (Multi)        4. Primary hypertension        5. Pacemaker        6. Mixed hyperlipidemia        7. BMI 21.0-21.9, adult        8. Other fatigue        9. Never smoked cigarettes        10. Unspecified atrial flutter (Multi)                 Scribe Attestation  By signing my name below, Chey NAVA LPN, Scribe   attest that this documentation has been prepared under the direction and in the presence of Maame Adorno MD.     Provider Attestation - Scribe documentation    All medical record entries made by the Scribe were at my direction and personally dictated by me. I have reviewed the chart and agree that the record accurately reflects my personal performance of the history, physical exam, discussion and plan.

## 2025-02-26 NOTE — LETTER
February 26, 2025     Lucas Mendenhall MD  [address] [place] [id number]    Patient: Saniya Carrillo   YOB: 1940   Date of Visit: 2/26/2025       Dear Dr. Lucas Mendenhall MD:    Thank you for referring Saniya Carrillo to me for evaluation. Below are my notes for this consultation.  If you have questions, please do not hesitate to call me. I look forward to following your patient along with you.       Sincerely,     Maame Adorno MD      CC: No Recipients  ______________________________________________________________________________________    Subjective   Saniya Carrillo is a 84 y.o. female       Chief Complaint    Follow-up          HPI        Patient is here for follow-up continue management for classic tachybradycardia syndrome, long-term anticoagulation, hyperlipidemia.  Since last time I saw her she denies any change in cardiac status or symptoms.  She feels reasonably well.  She reports few episodes of orthostatic hypotension for which she was given some midodrine by her neurologist.  She use it sparingly.  Her recent lab  noted and reviewed with her.  Her last device check was about a year ago.  Family does not remember if 1 done in the last 6 months    ASSESSMENT:      1. 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  2. Anticoagulation has been discussed numerous times with the patient and her son. They both elected for aspirin therapy alone.  3. Hyperlipidemia, on treatment.  4. Kidney disease apparently approaching stage IV she followed by nephrology. She underwent redo shunt placement in the right arm now is functioning  5. Mild senile dementia.  6. Minimal obesity  7. Increased risk of fall. Patient reports a couple fall in the recent past with some  "component of orthostatic hypotension.  She was given midodrine by her neurologist            RECOMMENDATIONS:      1. The patient will remain on the same therapy unchanged. Encouraged her to continue to be compliant with aspirin  2. Risks, benefits, and alternatives of anticoagulation were reviewed with the patient. The patient elected for aspirin therapy.  3. The patient was advised to continue current therapy unchanged.  4. The patient was advised to notify me any change in cardiac status or symptoms.  5. I advised her to have a device check  6. We will see her back in the office in 6 months in followup.   Review of Systems   All other systems reviewed and are negative.           Vitals:    02/26/25 1441   BP: 80/50   BP Location: Left arm   Patient Position: Sitting   Pulse: 60   Weight: 61.7 kg (136 lb)   Height: 1.626 m (5' 4\")      EKG done in office today    Objective   Physical Exam  Constitutional:       Appearance: Normal appearance.   HENT:      Nose: Nose normal.   Neck:      Vascular: No carotid bruit.   Cardiovascular:      Rate and Rhythm: Normal rate.      Pulses: Normal pulses.      Heart sounds: Normal heart sounds.   Pulmonary:      Effort: Pulmonary effort is normal.   Abdominal:      General: Bowel sounds are normal.      Palpations: Abdomen is soft.   Musculoskeletal:         General: Normal range of motion.      Cervical back: Normal range of motion.      Right lower leg: No edema.      Left lower leg: No edema.   Skin:     General: Skin is warm and dry.   Neurological:      General: No focal deficit present.      Mental Status: She is alert.   Psychiatric:         Mood and Affect: Mood normal.         Behavior: Behavior normal.         Thought Content: Thought content normal.         Judgment: Judgment normal.         Allergies  Chlorpheniramine and Latex     Current Medications    Current Outpatient Medications:   •  Aricept 10 mg tablet, Take 1 tablet (10 mg) by mouth once daily., Disp: , " Rfl:   •  aspirin 81 mg EC tablet, Take 1 tablet (81 mg) by mouth once daily., Disp: , Rfl:   •  buPROPion XL (Wellbutrin XL) 150 mg 24 hr tablet, Take 1 tablet (150 mg) by mouth 2 times a day., Disp: , Rfl:   •  calcitriol (Rocaltrol) 0.25 mcg capsule, Take 1 capsule (0.25 mcg) by mouth 2 times a week. Monday and Friday, Disp: , Rfl:   •  cholecalciferol (Vitamin D-3) 25 MCG (1000 UT) capsule, Take 1 capsule (25 mcg) by mouth once daily., Disp: , Rfl:   •  citalopram (CeleXA) 20 mg tablet, Take 1 tablet (20 mg) by mouth once daily., Disp: , Rfl:   •  docusate sodium (Colace) 100 mg capsule, Twice daily, Disp: , Rfl:   •  ferrous sulfate, as mg of FE, 15 mg iron (75 mg)/mL drops, Refills(s) 0 (Patient taking differently: As needed at dialysis), Disp: , Rfl:   •  flecainide (Tambocor) 50 mg tablet, TAKE 1 TABLET BY MOUTH EVERY 12 HOURS, Disp: 180 tablet, Rfl: 3  •  levothyroxine (Synthroid, Levoxyl) 100 mcg tablet, Take 1 tablet (100 mcg) by mouth once daily., Disp: , Rfl:   •  LORazepam (Ativan) 0.5 mg tablet, Take 1 tablet (0.5 mg) by mouth if needed., Disp: , Rfl:   •  midodrine (Proamatine) 5 mg tablet, 1 tablet (5 mg) if needed., Disp: , Rfl:   •  pantoprazole (ProtoNix) 40 mg EC tablet, Take 1 tablet (40 mg) by mouth once daily., Disp: , Rfl:   •  rosuvastatin (Crestor) 5 mg tablet, Take 1 tablet (5 mg) by mouth once daily at bedtime., Disp: 90 tablet, Rfl: 3  •  ubidecarenone (coenzyme Q10) 50 mg tablet,chewable, as directed Orally, Disp: , Rfl:                      Assessment/Plan   1. Sick sinus syndrome due to SA node dysfunction (Multi)        2. Typical atrial flutter (Multi)  Follow Up In Cardiology      3. Paroxysmal atrial fibrillation (Multi)        4. Primary hypertension        5. Pacemaker        6. Mixed hyperlipidemia        7. BMI 21.0-21.9, adult        8. Other fatigue        9. Never smoked cigarettes        10. Unspecified atrial flutter (Multi)                 Scribe Attestation  By signing  my name below, I, Chey WOODY LPN, Scribe   attest that this documentation has been prepared under the direction and in the presence of Maame Adorno MD.     Provider Attestation - Scribe documentation    All medical record entries made by the Scribe were at my direction and personally dictated by me. I have reviewed the chart and agree that the record accurately reflects my personal performance of the history, physical exam, discussion and plan.

## 2025-02-26 NOTE — PATIENT INSTRUCTIONS
Please bring all medicines, vitamins, and herbal supplements with you when you come to the office.    Prescriptions will not be filled unless you are compliant with your follow up appointments or have a follow up appointment scheduled as per instruction of your physician. Refills should be requested at the time of your visit.     6 months  Same medications  Pacemaker/Defibrillator follow up per routine

## 2025-05-05 ENCOUNTER — APPOINTMENT (OUTPATIENT)
Dept: OTOLARYNGOLOGY | Facility: CLINIC | Age: 85
End: 2025-05-05
Payer: MEDICARE

## 2025-05-05 NOTE — PROGRESS NOTES
ASSESSMENT AND PLAN:   Saniya Carrillo is a 84 y.o. female presenting for an initial visit with findings of ***           Reason For Consult  No chief complaint on file.    From the plan of Dr. Sary Valdez on 7/23/24:  I spent a considerable amount of time discussing exam ongoing issues with nasal congestion and possible postnasal drip.  On nasal endoscopy she does not have any evidence of sinusitis or postnasal drainage.  She does seem to have some postcricoid swelling however.  It is possible that undertreated reflux disease could be contributing to her chronic cough and other symptoms.  She may have vasomotor rhinitis but if so this is a relatively minor contributor. She did not respond to Clarifix or ipratropium bromide.  Her main issue is habitual and chronic throat clearing which both her and her son state is occurring throughout the day every day.  I offered her a trial of mometasone rinses but she was not interested.  I think it would be reasonable for her to visit with our laryngology staff to determine if she has any other factors that would be contributing to her chronic cough       HISTORY OF PRESENT ILLNESS:  Saniya Carrillo is a 84 y.o. female presenting for an initial visit with me for chronic throat clearing.      The patient reports ***.         Past Medical History  She has no past medical history on file. Surgical History  She has a past surgical history that includes Other surgical history (12/23/2021); Other surgical history (12/23/2021); Other surgical history (12/23/2021); Other surgical history (12/23/2021); Other surgical history (12/23/2021); Other surgical history (12/23/2021); Other surgical history (12/23/2021); and AV fistula placement.   Social History  She reports that she has never smoked. She has never used smokeless tobacco. She reports that she does not drink alcohol and does not use drugs. Allergies  Chlorpheniramine and Latex     Family History  Family History[1]     Review  of Systems  All 10 systems were reviewed and negative except for above.      Physical Exam    ENT Physical Exam   ENT Physical Exam  Constitutional  Appearance: patient appears well-developed and well-nourished,  Head and Face  Appearance: head appears normal and face appears normal;  Ear  Auricles: right auricle normal; left auricle normal;  Nose  External Nose: nares patent bilaterally;  Oral Cavity/Oropharynx  Lips: normal;  Neck  Neck: neck normal; neck palpation normal;  Respiratory  Inspection: no retractions visible;  Cardiovascular  Inspection: no peripheral edema present;  Neurovestibular  Mental Status: alert and oriented;  Psychiatric: mood normal;  Cranial Nerves: cranial nerves intact;    Last Recorded Vitals  There were no vitals taken for this visit.    Relevant Results       ----------------------------------------------------------------------  Procedures   ***    Time Spent  Prep time on day of patient encounter: 5-10 minutes  Time spent directly with patient, family or caregiver: 25 minutes  Additional Time Spent on Patient Care Activities/discuss care plan with SLP: 5 minutes  Documentation Time: 10 minutes  Other Time Spent: 0 minutes  Total: 50 minutes     Scribe Attestation  By signing my name below, I, Pina Logan , Scribcullen   attest that this documentation has been prepared under the direction and in the presence of Ruddy Salinas MD.       [1]   Family History  Problem Relation Name Age of Onset    Asthma Mother      Childhood respiratory disease Brother

## 2025-09-03 ENCOUNTER — APPOINTMENT (OUTPATIENT)
Dept: CARDIOLOGY | Facility: CLINIC | Age: 85
End: 2025-09-03
Payer: MEDICARE

## 2025-09-04 ENCOUNTER — APPOINTMENT (OUTPATIENT)
Dept: CARDIOLOGY | Facility: CLINIC | Age: 85
End: 2025-09-04
Payer: MEDICARE

## 2025-10-07 ENCOUNTER — APPOINTMENT (OUTPATIENT)
Dept: CARDIOLOGY | Facility: CLINIC | Age: 85
End: 2025-10-07
Payer: MEDICARE